# Patient Record
Sex: MALE | Race: WHITE | NOT HISPANIC OR LATINO | URBAN - METROPOLITAN AREA
[De-identification: names, ages, dates, MRNs, and addresses within clinical notes are randomized per-mention and may not be internally consistent; named-entity substitution may affect disease eponyms.]

---

## 2018-02-15 ENCOUNTER — OFFICE VISIT (OUTPATIENT)
Dept: SURGERY | Facility: CLINIC | Age: 53
End: 2018-02-15
Payer: COMMERCIAL

## 2018-02-15 VITALS
DIASTOLIC BLOOD PRESSURE: 87 MMHG | HEART RATE: 73 BPM | WEIGHT: 221.31 LBS | SYSTOLIC BLOOD PRESSURE: 157 MMHG | HEIGHT: 70 IN | BODY MASS INDEX: 31.68 KG/M2

## 2018-02-15 DIAGNOSIS — K51.019 ULCERATIVE PANCOLITIS WITH COMPLICATION: Primary | ICD-10-CM

## 2018-02-15 PROCEDURE — 3008F BODY MASS INDEX DOCD: CPT | Mod: S$GLB,,, | Performed by: COLON & RECTAL SURGERY

## 2018-02-15 PROCEDURE — 99999 PR PBB SHADOW E&M-NEW PATIENT-LVL III: CPT | Mod: PBBFAC,,, | Performed by: COLON & RECTAL SURGERY

## 2018-02-15 PROCEDURE — 99203 OFFICE O/P NEW LOW 30 MIN: CPT | Mod: S$GLB,,, | Performed by: COLON & RECTAL SURGERY

## 2018-02-15 RX ORDER — PANTOPRAZOLE SODIUM 20 MG/1
TABLET, DELAYED RELEASE ORAL
COMMUNITY
Start: 2017-06-20

## 2018-02-15 RX ORDER — DEXTROAMPHETAMINE SACCHARATE, AMPHETAMINE ASPARTATE, DEXTROAMPHETAMINE SULFATE AND AMPHETAMINE SULFATE 1.25; 1.25; 1.25; 1.25 MG/1; MG/1; MG/1; MG/1
30 TABLET ORAL
COMMUNITY

## 2018-02-15 RX ORDER — OXYCODONE HYDROCHLORIDE 5 MG/1
5 CAPSULE ORAL EVERY 4 HOURS PRN
COMMUNITY

## 2018-02-15 RX ORDER — CELECOXIB 200 MG/1
200 CAPSULE ORAL 2 TIMES DAILY
COMMUNITY

## 2018-02-15 NOTE — PROGRESS NOTES
CRS Office Visit History and Physical    Referring Md:   Carroll Gray Md  [address] [address]  [place] [address]    SUBJECTIVE:     Chief Complaint: Evaluation for stoma reversal    History of Present Illness:  Patient is a 52 y.o. male presents with evaluation for stoma reversal. The patient is a new patient to this practice.   Course is as follows:     David Baker is a 52 y.o. Male w/ hx of Ulcerative colitis, who presents in clinic today status post colectomy and end ileostomy in 1981 who would like to discuss the possibility of IPAA. He reports that he has had his ileostomy since the age of 16 and has had no issues with his ostomy.  He denies any bleeding from the stoma.  He does not recall whether the rectum was removed at the time of his original surgery        Review of patient's allergies indicates:   Allergen Reactions    Penicillins Anaphylaxis    Tetanus toxoid, adsorbed Anaphylaxis     paralysis    Vancomycin Anaphylaxis    Levofloxacin in d5w Other (See Comments)     Muscle weakness and tendon rupture       History reviewed. No pertinent past medical history.  History reviewed. No pertinent surgical history.  History reviewed. No pertinent family history.  Social History   Substance Use Topics    Smoking status: Never Smoker    Smokeless tobacco: Not on file    Alcohol use Not on file        Review of Systems:  ROS  Constitutional: Negative for activity change, appetite change, chills, fatigue and fever.   HENT: Negative for congestion, dental problem and sore throat.    Eyes: Negative for discharge.   Respiratory: Negative for chest tightness and shortness of breath.    Cardiovascular: Negative for chest pain and leg swelling.   Gastrointestinal: Negative for abdominal distention, abdominal pain, anal bleeding, blood in stool, constipation, diarrhea, nausea, rectal pain and vomiting.   Genitourinary: Negative for difficulty urinating and frequency.   Musculoskeletal:  "Negative for back pain.   Skin: Negative for color change.   Neurological: Negative for dizziness, light-headedness and headaches.   Psychiatric/Behavioral: Negative for decreased concentration.   OBJECTIVE:     Vital Signs (Most Recent)  BP (!) 157/87 (BP Location: Right arm, Patient Position: Sitting, BP Method: Large (Automatic))   Pulse 73   Ht 5' 10" (1.778 m)   Wt 100.4 kg (221 lb 5.5 oz)   BMI 31.76 kg/m²     Physical Exam:  Constitutional: He is oriented to person, place, and time. He appears well-developed and well-nourished.   HENT:   Head: Normocephalic and atraumatic.   Eyes: Conjunctivae are normal.   Neck: Normal range of motion. Neck supple.   Cardiovascular: Normal rate and intact distal pulses.    Pulmonary/Chest: Effort normal. No respiratory distress.   Abdominal: Soft. Bowel sounds are normal. He exhibits no distension and no mass. There is no tenderness. There is no rebound and no guarding. No hernia.   End ileostomy, no parastomal hernia   Musculoskeletal: Normal range of motion. He exhibits no edema or deformity.   Neurological: He is alert and oriented to person, place, and time.   Skin: Skin is warm and dry.   Psychiatric: He has a normal mood and affect. His behavior is normal. Judgment and thought content normal.   Nursing note and vitals reviewed.   No anus on exam, well healed proctocolectomy scar.       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  As the patient has no anus he is not a candidate for a pouch, this was discussed at length with the patient and he expressed understanding. He is comfortable with his ileostomy and has had no complications with it since the age of 16.   He can follow-up as needed.      HEMANT Dash MD  Staff " Surgeon  Colon & Rectal Surgery

## 2018-02-15 NOTE — LETTER
February 15, 2018      Carroll Gray MD  [address] [facility]  [place] [address]           [facility]  [address]  [place] [address]  Phone: [phone]          Patient: David Baker   MR Number: [id number]   YOB: 1965   Date of Visit: 2/15/2018       Dear Dr. Carroll Gray:    Thank you for referring David Baker to me for evaluation. Attached you will find relevant portions of my assessment and plan of care.    If you have questions, please do not hesitate to call me. I look forward to following David Baker along with you.    Sincerely,    Bret Dash MD    Enclosure  CC:  No Recipients    If you would like to receive this communication electronically, please contact [email or web address] or [phone] to request more information on [employer] access.    For providers and/or their staff who would like to refer a patient to [facility], please contact us through our one-stop-shop provider referral line, [facility], at [phone].    If you feel you have received this communication in error or would no longer like to receive these types of communications, please e-mail [email or web address]

## 2018-02-15 NOTE — CONSULTS
Patient ID: David Baker is a 52 y.o. male.    Chief Complaint: No chief complaint on file.      HPI:  HPI    David Baker is a 52 y.o. Male w/ hx of Ulcerative colitis, who presents in clinic today s/p likely total proctocolectomy (patient has no anus on exam) and end ileostomy in 1981 who would like to discuss the possibility of IPAA. He reports that he has had his ileostomy since the age of 16 and has had no issues with his ostomy.     Review of Systems   Constitutional: Negative for activity change, appetite change, chills, fatigue and fever.   HENT: Negative for congestion, dental problem and sore throat.    Eyes: Negative for discharge.   Respiratory: Negative for chest tightness and shortness of breath.    Cardiovascular: Negative for chest pain and leg swelling.   Gastrointestinal: Negative for abdominal distention, abdominal pain, anal bleeding, blood in stool, constipation, diarrhea, nausea, rectal pain and vomiting.   Genitourinary: Negative for difficulty urinating and frequency.   Musculoskeletal: Negative for back pain.   Skin: Negative for color change.   Neurological: Negative for dizziness, light-headedness and headaches.   Psychiatric/Behavioral: Negative for decreased concentration.       Current Outpatient Prescriptions   Medication Sig Dispense Refill    celecoxib (CELEBREX) 200 MG capsule Take 200 mg by mouth 2 (two) times daily.      oxyCODONE (OXY-IR) 5 mg Cap Take 5 mg by mouth every 4 (four) hours as needed for Pain.      pantoprazole (PROTONIX) 20 MG tablet TAKE ONE TABLET BY MOUTH DAILY      dextroamphetamine-amphetamine (ADDERALL) 5 mg Tab Take 30 mg by mouth.       No current facility-administered medications for this visit.        Review of patient's allergies indicates:   Allergen Reactions    Penicillins Anaphylaxis    Tetanus toxoid, adsorbed Anaphylaxis     paralysis    Vancomycin Anaphylaxis    Levofloxacin in d5w Other (See Comments)     Muscle weakness  and tendon rupture       No past medical history on file.    No past surgical history on file.    No family history on file.    Social History     Social History    Marital status:      Spouse name: N/A    Number of children: N/A    Years of education: N/A     Occupational History    Not on file.     Social History Main Topics    Smoking status: Never Smoker    Smokeless tobacco: Not on file    Alcohol use Not on file    Drug use: Unknown    Sexual activity: Not on file     Other Topics Concern    Not on file     Social History Narrative    No narrative on file       Vitals:    02/15/18 1100   BP: (!) 157/87   Pulse: 73       Physical Exam   Constitutional: He is oriented to person, place, and time. He appears well-developed and well-nourished.   HENT:   Head: Normocephalic and atraumatic.   Eyes: Conjunctivae are normal.   Neck: Normal range of motion. Neck supple.   Cardiovascular: Normal rate and intact distal pulses.    Pulmonary/Chest: Effort normal. No respiratory distress.   Abdominal: Soft. Bowel sounds are normal. He exhibits no distension and no mass. There is no tenderness. There is no rebound and no guarding. No hernia.   End ileostomy, no parastomal hernia   Musculoskeletal: Normal range of motion. He exhibits no edema or deformity.   Neurological: He is alert and oriented to person, place, and time.   Skin: Skin is warm and dry.   Psychiatric: He has a normal mood and affect. His behavior is normal. Judgment and thought content normal.   Nursing note and vitals reviewed.   No anus on exam, well healed proctocolectomy scar.     Assessment & Plan:      51 y/o patient w/ hx of UC s/p total proctocolectomy w/ end ileostomy in 1981 here at [facility] here to discuss pouch.     As the patient has no anus he is not a candidate for a pouch, this was discussed at length with the patient and he expressed understanding. He is comfortable with his ileostomy and has had no complications with it since  the age of 16.     Follow up PRN.